# Patient Record
Sex: MALE | URBAN - METROPOLITAN AREA
[De-identification: names, ages, dates, MRNs, and addresses within clinical notes are randomized per-mention and may not be internally consistent; named-entity substitution may affect disease eponyms.]

---

## 2024-02-09 ENCOUNTER — OFFICE VISIT (OUTPATIENT)
Dept: FAMILY MEDICINE CLINIC | Age: 44
End: 2024-02-09
Payer: MEDICAID

## 2024-02-09 VITALS
WEIGHT: 170.4 LBS | BODY MASS INDEX: 24.39 KG/M2 | TEMPERATURE: 97 F | DIASTOLIC BLOOD PRESSURE: 90 MMHG | SYSTOLIC BLOOD PRESSURE: 144 MMHG | HEART RATE: 70 BPM | HEIGHT: 70 IN | OXYGEN SATURATION: 98 %

## 2024-02-09 DIAGNOSIS — Z13.220 SCREENING FOR LIPID DISORDERS: ICD-10-CM

## 2024-02-09 DIAGNOSIS — Z11.59 NEED FOR HEPATITIS C SCREENING TEST: ICD-10-CM

## 2024-02-09 DIAGNOSIS — Z13.29 SCREENING FOR THYROID DISORDER: ICD-10-CM

## 2024-02-09 DIAGNOSIS — K64.4 EXTERNAL HEMORRHOID: ICD-10-CM

## 2024-02-09 DIAGNOSIS — Z13.0 SCREENING FOR DEFICIENCY ANEMIA: Primary | ICD-10-CM

## 2024-02-09 DIAGNOSIS — Z13.1 SCREENING FOR DIABETES MELLITUS: ICD-10-CM

## 2024-02-09 DIAGNOSIS — Z13.21 ENCOUNTER FOR VITAMIN DEFICIENCY SCREENING: ICD-10-CM

## 2024-02-09 DIAGNOSIS — F41.9 ANXIETY: ICD-10-CM

## 2024-02-09 PROCEDURE — 99204 OFFICE O/P NEW MOD 45 MIN: CPT | Performed by: STUDENT IN AN ORGANIZED HEALTH CARE EDUCATION/TRAINING PROGRAM

## 2024-02-09 SDOH — ECONOMIC STABILITY: FOOD INSECURITY: WITHIN THE PAST 12 MONTHS, YOU WORRIED THAT YOUR FOOD WOULD RUN OUT BEFORE YOU GOT MONEY TO BUY MORE.: NEVER TRUE

## 2024-02-09 SDOH — ECONOMIC STABILITY: INCOME INSECURITY: HOW HARD IS IT FOR YOU TO PAY FOR THE VERY BASICS LIKE FOOD, HOUSING, MEDICAL CARE, AND HEATING?: NOT HARD AT ALL

## 2024-02-09 SDOH — ECONOMIC STABILITY: HOUSING INSECURITY
IN THE LAST 12 MONTHS, WAS THERE A TIME WHEN YOU DID NOT HAVE A STEADY PLACE TO SLEEP OR SLEPT IN A SHELTER (INCLUDING NOW)?: NO

## 2024-02-09 SDOH — ECONOMIC STABILITY: FOOD INSECURITY: WITHIN THE PAST 12 MONTHS, THE FOOD YOU BOUGHT JUST DIDN'T LAST AND YOU DIDN'T HAVE MONEY TO GET MORE.: NEVER TRUE

## 2024-02-09 ASSESSMENT — ANXIETY QUESTIONNAIRES
GAD7 TOTAL SCORE: 11
7. FEELING AFRAID AS IF SOMETHING AWFUL MIGHT HAPPEN: 3
4. TROUBLE RELAXING: 1
5. BEING SO RESTLESS THAT IT IS HARD TO SIT STILL: 0
3. WORRYING TOO MUCH ABOUT DIFFERENT THINGS: 3
6. BECOMING EASILY ANNOYED OR IRRITABLE: 0
IF YOU CHECKED OFF ANY PROBLEMS ON THIS QUESTIONNAIRE, HOW DIFFICULT HAVE THESE PROBLEMS MADE IT FOR YOU TO DO YOUR WORK, TAKE CARE OF THINGS AT HOME, OR GET ALONG WITH OTHER PEOPLE: EXTREMELY DIFFICULT
1. FEELING NERVOUS, ANXIOUS, OR ON EDGE: 3
2. NOT BEING ABLE TO STOP OR CONTROL WORRYING: 1

## 2024-02-09 ASSESSMENT — PATIENT HEALTH QUESTIONNAIRE - PHQ9
SUM OF ALL RESPONSES TO PHQ QUESTIONS 1-9: 1
1. LITTLE INTEREST OR PLEASURE IN DOING THINGS: 0
SUM OF ALL RESPONSES TO PHQ QUESTIONS 1-9: 1
2. FEELING DOWN, DEPRESSED OR HOPELESS: 1
SUM OF ALL RESPONSES TO PHQ QUESTIONS 1-9: 1
SUM OF ALL RESPONSES TO PHQ QUESTIONS 1-9: 1
SUM OF ALL RESPONSES TO PHQ9 QUESTIONS 1 & 2: 1

## 2024-02-09 ASSESSMENT — ENCOUNTER SYMPTOMS: HEMATOCHEZIA: 1

## 2024-02-09 NOTE — PROGRESS NOTES
[facility]  [facility]  [address]  [place] [address]     Date of Visit:  2024  Patient Name: Preet Soni   Patient :  1980     CHIEF COMPLAINT:     Preet Snoi is a 43 y.o. male who presents today for an general visit to be evaluated for the following condition(s):  Chief Complaint   Patient presents with    Rectal Bleeding    New Patient     Previous PCP UNKNOWN       REVIEW OF SYSTEM      Review of Systems   Gastrointestinal:  Positive for hematochezia.       HISTORY OF PRESENT ILLNESS     Rectal Bleeding       Patient is a 43-year-old male who presents to the office as a new patient.  Patient states that he has been having off-and-on rectal bleeding for the past 10 years.  Patient states that he has potentially internal and external hemorrhoids.  Patient has changed his diet and lost about 60 pounds.    Panic/anxiety disorder:  Patient states that he has really bad generalized anxiety disorder.  He also has panic attacks as well.  Patient states that he was taking benzos for about 10 years and was able to wean himself off of the benzos.  Patient has followed up with a psych oncologist and is not interested in medications.  Patient states that he has had seizures/convulsions a couple times in his life most recently yesterday.  Patient states that he blacks out and he shakes.  Patient states that his loss of consciousness is for about 10 to 20 seconds.  Patient states that he has never followed up with a neurologist about this as he has not had insurance for over 20 years.  REVIEWED INFORMATION      Allergies   Allergen Reactions    Penicillins      As a child       Patient Active Problem List   Diagnosis    External hemorrhoid    Anxiety       Past Medical History:   Diagnosis Date    Anxiety     Panic disorder        Past Surgical History:   Procedure Laterality Date    MANDIBLE FRACTURE SURGERY          Social History     Socioeconomic History    Marital

## 2024-02-09 NOTE — ASSESSMENT & PLAN NOTE
A/P: uncontrolled  -bleeding for ten years, patient would like a colonoscopy  -intentional 60 pound weight loss over the last 2 years  -referral to GI doctor

## 2024-02-09 NOTE — ASSESSMENT & PLAN NOTE
A/P: uncontrolled  -follow up with therapist  -convulsions appear as seizures, discussed following up with neurologist. Patient would like to discuss with his wife.

## 2024-03-05 ENCOUNTER — HOSPITAL ENCOUNTER (OUTPATIENT)
Age: 44
Setting detail: SPECIMEN
Discharge: HOME | End: 2024-03-05

## 2024-03-05 ENCOUNTER — OFFICE VISIT (OUTPATIENT)
Dept: FAMILY MEDICINE CLINIC | Age: 44
End: 2024-03-05

## 2024-03-05 VITALS
OXYGEN SATURATION: 100 % | DIASTOLIC BLOOD PRESSURE: 76 MMHG | SYSTOLIC BLOOD PRESSURE: 130 MMHG | TEMPERATURE: 97.4 F | HEART RATE: 76 BPM

## 2024-03-05 DIAGNOSIS — Z13.29 SCREENING FOR THYROID DISORDER: ICD-10-CM

## 2024-03-05 DIAGNOSIS — Z11.59 NEED FOR HEPATITIS C SCREENING TEST: ICD-10-CM

## 2024-03-05 DIAGNOSIS — Z13.0 SCREENING FOR DEFICIENCY ANEMIA: ICD-10-CM

## 2024-03-05 DIAGNOSIS — Z13.1 SCREENING FOR DIABETES MELLITUS: ICD-10-CM

## 2024-03-05 DIAGNOSIS — Z13.21 ENCOUNTER FOR VITAMIN DEFICIENCY SCREENING: ICD-10-CM

## 2024-03-05 DIAGNOSIS — L98.9 SKIN LESION: ICD-10-CM

## 2024-03-05 DIAGNOSIS — Z00.00 PHYSICAL EXAM: Primary | ICD-10-CM

## 2024-03-05 DIAGNOSIS — Z72.0 TOBACCO ABUSE: ICD-10-CM

## 2024-03-05 DIAGNOSIS — Z13.220 SCREENING FOR LIPID DISORDERS: ICD-10-CM

## 2024-03-05 LAB
25(OH)D3 SERPL-MCNC: 27.7 NG/ML (ref 30–100)
ALBUMIN SERPL-MCNC: 5.4 G/DL (ref 3.5–5.2)
ALBUMIN/GLOB SERPL: 2 {RATIO} (ref 1–2.5)
ALP SERPL-CCNC: 64 U/L (ref 40–129)
ALT SERPL-CCNC: 18 U/L (ref 10–50)
ANION GAP SERPL CALCULATED.3IONS-SCNC: 16 MMOL/L (ref 9–16)
AST SERPL-CCNC: 26 U/L (ref 10–50)
BASOPHILS # BLD: 0.06 K/UL (ref 0–0.2)
BASOPHILS NFR BLD: 1 % (ref 0–2)
BILIRUB SERPL-MCNC: 1.3 MG/DL (ref 0–1.2)
BUN SERPL-MCNC: 16 MG/DL (ref 6–20)
CALCIUM SERPL-MCNC: 10 MG/DL (ref 8.6–10.4)
CHLORIDE SERPL-SCNC: 101 MMOL/L (ref 98–107)
CHOLEST SERPL-MCNC: 196 MG/DL (ref 0–199)
CHOLESTEROL/HDL RATIO: 3
CO2 SERPL-SCNC: 24 MMOL/L (ref 20–31)
CREAT SERPL-MCNC: 0.9 MG/DL (ref 0.7–1.2)
EOSINOPHIL # BLD: 0.1 K/UL (ref 0–0.44)
EOSINOPHILS RELATIVE PERCENT: 1 % (ref 1–4)
ERYTHROCYTE [DISTWIDTH] IN BLOOD BY AUTOMATED COUNT: 13.4 % (ref 11.8–14.4)
EST. AVERAGE GLUCOSE BLD GHB EST-MCNC: 103 MG/DL
FOLATE SERPL-MCNC: 6.5 NG/ML
GFR SERPL CREATININE-BSD FRML MDRD: >90 ML/MIN/1.73M2
GLUCOSE SERPL-MCNC: 106 MG/DL (ref 74–99)
HBA1C MFR BLD: 5.2 % (ref 4–6)
HCT VFR BLD AUTO: 46.5 % (ref 40.7–50.3)
HCV AB SERPL QL IA: NONREACTIVE
HDLC SERPL-MCNC: 59 MG/DL
HGB BLD-MCNC: 15.1 G/DL (ref 13–17)
IMM GRANULOCYTES # BLD AUTO: <0.03 K/UL (ref 0–0.3)
IMM GRANULOCYTES NFR BLD: 0 %
LDLC SERPL CALC-MCNC: 122 MG/DL (ref 0–100)
LYMPHOCYTES NFR BLD: 2.02 K/UL (ref 1.1–3.7)
LYMPHOCYTES RELATIVE PERCENT: 26 % (ref 24–43)
MCH RBC QN AUTO: 30.1 PG (ref 25.2–33.5)
MCHC RBC AUTO-ENTMCNC: 32.5 G/DL (ref 28.4–34.8)
MCV RBC AUTO: 92.6 FL (ref 82.6–102.9)
MONOCYTES NFR BLD: 0.62 K/UL (ref 0.1–1.2)
MONOCYTES NFR BLD: 8 % (ref 3–12)
NEUTROPHILS NFR BLD: 64 % (ref 36–65)
NEUTS SEG NFR BLD: 4.87 K/UL (ref 1.5–8.1)
NRBC BLD-RTO: 0 PER 100 WBC
PLATELET # BLD AUTO: 276 K/UL (ref 138–453)
PMV BLD AUTO: 11.7 FL (ref 8.1–13.5)
POTASSIUM SERPL-SCNC: 3.8 MMOL/L (ref 3.7–5.3)
PROT SERPL-MCNC: 8.6 G/DL (ref 6.6–8.7)
RBC # BLD AUTO: 5.02 M/UL (ref 4.21–5.77)
SODIUM SERPL-SCNC: 141 MMOL/L (ref 136–145)
TRIGL SERPL-MCNC: 77 MG/DL
TSH SERPL DL<=0.05 MIU/L-ACNC: 1.46 UIU/ML (ref 0.27–4.2)
VIT B12 SERPL-MCNC: 428 PG/ML (ref 232–1245)
VLDLC SERPL CALC-MCNC: 15 MG/DL
WBC OTHER # BLD: 7.7 K/UL (ref 3.5–11.3)

## 2024-03-05 NOTE — ASSESSMENT & PLAN NOTE
A/P: uncontrolled  -plans to start lozengers nicotine and reduce the amount over time. Will stop the vape.

## 2024-03-05 NOTE — ASSESSMENT & PLAN NOTE
Physical exam: Stable  Diet: Advised high fiber diet of 20 gram a day by increasing fresh fruits, vegetables and nuts. Advised to take a fiber gummy   Exercise: Advised moderate intensity exercise (brisk walking) 150 minutes a week or at least 5,000-10,000 steps a day.  Sleep: Advised good sleep habits including turning tv and phone off. 6-8 hours advised.   No data recorded

## 2024-03-05 NOTE — PROGRESS NOTES
[facility]  [facility]  [address]  [facility] [address]     Date of Visit:  3/7/2024  Patient Name: Preet Soni   Patient :  1980     CHIEF COMPLAINT:     Preet Soni is a 43 y.o. male who presents today for an general visit to be evaluated for the following condition(s):  Chief Complaint   Patient presents with    Annual Exam    Discuss Labs       REVIEW OF SYSTEM      Review of Systems   Gastrointestinal:  Positive for constipation.   All other systems reviewed and are negative.      HISTORY OF PRESENT ILLNESS     HPI  Diet:  Stops eating around 8- 9pm, only drinks water.  Eats a heavy protien breakfast, eggs, sausage. Lunch eats about 800-1000 calories for lunch. Meat the main for/ Eats carbohydrates on the weekend  including snacks. Eats peanut butter white bread for snack. Has started taking vitamin d gummies.  Drinks soda once every month. Zero alcohol.  Eats salad a few times a week. Does not like fish and seafood. Eats a lot of red meat         Exercise:  Was initially exercising 7 days a week. Exercising currently 3 days a week MWF. Starts with stretching and yoga, weight lifting.Does not do Cardio.  Goes for walks on Tuesday and Thursday. Trying to incorporate more i        Sleep:  Baby sleeps in room with him. Sleeps about 10pm-5:30am/ No night time awakenings, only if drinks too much water. No issues with falling asleep. Use to snore and have apenic episodes but went away when he lost weight.     REVIEWED INFORMATION      Allergies   Allergen Reactions    Penicillins      As a child       Patient Active Problem List   Diagnosis    External hemorrhoid    Anxiety    Tobacco abuse    Physical exam    Skin lesion       Past Medical History:   Diagnosis Date    Anxiety     Panic disorder        Past Surgical History:   Procedure Laterality Date    MANDIBLE FRACTURE SURGERY          Social History     Socioeconomic History    Marital status:      Spouse

## 2024-03-07 DIAGNOSIS — E55.9 VITAMIN D DEFICIENCY: Primary | ICD-10-CM

## 2024-03-07 PROBLEM — L98.9 SKIN LESION: Status: ACTIVE | Noted: 2024-03-07

## 2024-03-07 ASSESSMENT — ENCOUNTER SYMPTOMS: CONSTIPATION: 1

## 2024-03-07 NOTE — ASSESSMENT & PLAN NOTE
A/P: uncontrolled   -patient has had for a few years, has not grown in size  -discussed following up with dermatologist. Patient states that he would like to think about it at next visit.

## 2024-04-04 PROBLEM — Z00.00 PHYSICAL EXAM: Status: RESOLVED | Noted: 2024-03-05 | Resolved: 2024-04-04

## 2024-04-18 ENCOUNTER — OFFICE VISIT (OUTPATIENT)
Dept: GASTROENTEROLOGY | Age: 44
End: 2024-04-18
Payer: MEDICAID

## 2024-04-18 VITALS
TEMPERATURE: 98.6 F | HEART RATE: 90 BPM | HEIGHT: 70 IN | RESPIRATION RATE: 19 BRPM | DIASTOLIC BLOOD PRESSURE: 103 MMHG | OXYGEN SATURATION: 98 % | BODY MASS INDEX: 24.05 KG/M2 | WEIGHT: 168 LBS | SYSTOLIC BLOOD PRESSURE: 183 MMHG

## 2024-04-18 DIAGNOSIS — F41.9 ANXIETY: ICD-10-CM

## 2024-04-18 DIAGNOSIS — K92.1 HEMATOCHEZIA: Primary | ICD-10-CM

## 2024-04-18 DIAGNOSIS — F12.90 CANNABIS USE DISORDER: ICD-10-CM

## 2024-04-18 PROCEDURE — 99204 OFFICE O/P NEW MOD 45 MIN: CPT | Performed by: INTERNAL MEDICINE

## 2024-04-18 ASSESSMENT — ENCOUNTER SYMPTOMS
DIARRHEA: 0
ABDOMINAL DISTENTION: 0
VOMITING: 0
SORE THROAT: 0
ANAL BLEEDING: 0
CONSTIPATION: 1
COUGH: 0
BLOOD IN STOOL: 1
ABDOMINAL PAIN: 0
TROUBLE SWALLOWING: 0
NAUSEA: 0
CHOKING: 0
WHEEZING: 0
RECTAL PAIN: 0

## 2024-04-18 NOTE — PROGRESS NOTES
Reason for Referral:   Indiana Rainey MD  [address]  [facility] [address] [place] [address]    Chief Complaint   Patient presents with    New Patient     External Hemorrhoids       1. Hematochezia    2. Anxiety    3. Cannabis use disorder            HISTORY OF PRESENT ILLNESS:   Patient seen with the symptoms of intermittent hematochezia and a questionable lump in the rectum.    Patient has abdominal symptoms on and off for several months.  He denies significant constipation.  No diarrhea.  Has a mild tenesmus.    He also has weight loss.  He claims the weight loss is voluntary.    He denies taking anticoagulation therapy or NSAIDs.  Denies abdominal distention bloating etc.  No dysphagia dyspepsia.  Good appetite.  Not known to have liver disease.    Patient used to take nonprescription drugs/benzodiazepines in the past.  However he states for several years he is clean.  At present he is taking cannabis on daily basis.    He has history of significant anxiety stress.    Currently he is not on any prescription medications.    His labs in March 2024 revealed hemoglobin was 15.  Platelet count 276.  Liver tests within normal limits.      Past medical,Family, and Social History reviewed and does contribute to the patient presentingcondition.    Patient's PMH/PSH,SH,PSYCH Hx, MEDs, ALLERGIES, and ROS were all reviewed and updated in the appropriate sections.    PAST MEDICAL HISTORY:  Past Medical History:   Diagnosis Date    Anxiety     Panic disorder        Past Surgical History:   Procedure Laterality Date    MANDIBLE FRACTURE SURGERY         CURRENT MEDICATIONS:    Current Outpatient Medications:     vitamin D (CHOLECALCIFEROL) 25 MCG (1000 UT) TABS tablet, Take 1 tablet by mouth daily, Disp: 90 tablet, Rfl: 1    ALLERGIES:   Allergies   Allergen Reactions    Penicillins      As a child       FAMILY HISTORY:       Problem Relation Age of Onset    Breast Cancer Mother     Diabetes Father     COPD

## 2024-04-22 ENCOUNTER — TELEPHONE (OUTPATIENT)
Dept: GASTROENTEROLOGY | Age: 44
End: 2024-04-22

## 2024-04-22 NOTE — TELEPHONE ENCOUNTER
Writer attempted to schedule patient with Dr Araujo for colon procedure (Per [place] schedule with Gayle) christianr attempted to schedule patient for procedure on 5/24/24 at 2pm [facility] however, patient did not answer phone-writer FRANKY asking for patient to contact our office for scheduling.